# Patient Record
Sex: MALE | Race: BLACK OR AFRICAN AMERICAN | NOT HISPANIC OR LATINO | Employment: FULL TIME | URBAN - METROPOLITAN AREA
[De-identification: names, ages, dates, MRNs, and addresses within clinical notes are randomized per-mention and may not be internally consistent; named-entity substitution may affect disease eponyms.]

---

## 2024-02-23 ENCOUNTER — HOSPITAL ENCOUNTER (EMERGENCY)
Facility: CLINIC | Age: 49
Discharge: HOME | End: 2024-02-23
Attending: INTERNAL MEDICINE | Admitting: INTERNAL MEDICINE
Payer: OTHER MISCELLANEOUS

## 2024-02-23 ENCOUNTER — APPOINTMENT (OUTPATIENT)
Dept: GENERAL RADIOLOGY | Facility: CLINIC | Age: 49
End: 2024-02-23
Attending: INTERNAL MEDICINE
Payer: OTHER MISCELLANEOUS

## 2024-02-23 VITALS
TEMPERATURE: 97.9 F | BODY MASS INDEX: 25.76 KG/M2 | HEART RATE: 72 BPM | DIASTOLIC BLOOD PRESSURE: 101 MMHG | HEIGHT: 68 IN | OXYGEN SATURATION: 100 % | WEIGHT: 170 LBS | RESPIRATION RATE: 18 BRPM | SYSTOLIC BLOOD PRESSURE: 166 MMHG

## 2024-02-23 DIAGNOSIS — S69.92XA FINGER INJURY, LEFT, INITIAL ENCOUNTER: ICD-10-CM

## 2024-02-23 PROCEDURE — 99284 EMERGENCY DEPT VISIT MOD MDM: CPT | Performed by: INTERNAL MEDICINE

## 2024-02-23 PROCEDURE — 90715 TDAP VACCINE 7 YRS/> IM: CPT | Performed by: INTERNAL MEDICINE

## 2024-02-23 PROCEDURE — 99284 EMERGENCY DEPT VISIT MOD MDM: CPT | Mod: 25 | Performed by: INTERNAL MEDICINE

## 2024-02-23 PROCEDURE — 73140 X-RAY EXAM OF FINGER(S): CPT | Mod: LT

## 2024-02-23 PROCEDURE — 250N000011 HC RX IP 250 OP 636: Performed by: INTERNAL MEDICINE

## 2024-02-23 PROCEDURE — 250N000009 HC RX 250: Performed by: INTERNAL MEDICINE

## 2024-02-23 PROCEDURE — 26750 TREAT FINGER FRACTURE EACH: CPT | Mod: F1 | Performed by: STUDENT IN AN ORGANIZED HEALTH CARE EDUCATION/TRAINING PROGRAM

## 2024-02-23 PROCEDURE — 90471 IMMUNIZATION ADMIN: CPT | Performed by: INTERNAL MEDICINE

## 2024-02-23 PROCEDURE — 11012 DEB SKIN BONE AT FX SITE: CPT | Performed by: STUDENT IN AN ORGANIZED HEALTH CARE EDUCATION/TRAINING PROGRAM

## 2024-02-23 RX ORDER — CEFAZOLIN SODIUM 2 G/100ML
2 INJECTION, SOLUTION INTRAVENOUS ONCE
Status: COMPLETED | OUTPATIENT
Start: 2024-02-23 | End: 2024-02-23

## 2024-02-23 RX ORDER — CEPHALEXIN 500 MG/1
500 CAPSULE ORAL 2 TIMES DAILY
Qty: 14 CAPSULE | Refills: 0 | Status: SHIPPED | OUTPATIENT
Start: 2024-02-23

## 2024-02-23 RX ORDER — IBUPROFEN 600 MG/1
600 TABLET, FILM COATED ORAL EVERY 6 HOURS PRN
Qty: 30 TABLET | Refills: 0 | Status: SHIPPED | OUTPATIENT
Start: 2024-02-23

## 2024-02-23 RX ORDER — LIDOCAINE HYDROCHLORIDE 10 MG/ML
10 INJECTION, SOLUTION INFILTRATION; PERINEURAL ONCE
Status: COMPLETED | OUTPATIENT
Start: 2024-02-23 | End: 2024-02-23

## 2024-02-23 RX ORDER — OXYCODONE HYDROCHLORIDE 5 MG/1
5 TABLET ORAL EVERY 6 HOURS PRN
Qty: 10 TABLET | Refills: 0 | Status: SHIPPED | OUTPATIENT
Start: 2024-02-23

## 2024-02-23 RX ADMIN — CEFAZOLIN SODIUM 2 G: 2 INJECTION, SOLUTION INTRAVENOUS at 19:46

## 2024-02-23 RX ADMIN — CLOSTRIDIUM TETANI TOXOID ANTIGEN (FORMALDEHYDE INACTIVATED), CORYNEBACTERIUM DIPHTHERIAE TOXOID ANTIGEN (FORMALDEHYDE INACTIVATED), BORDETELLA PERTUSSIS TOXOID ANTIGEN (GLUTARALDEHYDE INACTIVATED), BORDETELLA PERTUSSIS FILAMENTOUS HEMAGGLUTININ ANTIGEN (FORMALDEHYDE INACTIVATED), BORDETELLA PERTUSSIS PERTACTIN ANTIGEN, AND BORDETELLA PERTUSSIS FIMBRIAE 2/3 ANTIGEN 0.5 ML: 5; 2; 2.5; 5; 3; 5 INJECTION, SUSPENSION INTRAMUSCULAR at 17:38

## 2024-02-23 RX ADMIN — LIDOCAINE HYDROCHLORIDE 10 ML: 10 INJECTION, SOLUTION INFILTRATION; PERINEURAL at 19:30

## 2024-02-23 ASSESSMENT — COLUMBIA-SUICIDE SEVERITY RATING SCALE - C-SSRS
6. HAVE YOU EVER DONE ANYTHING, STARTED TO DO ANYTHING, OR PREPARED TO DO ANYTHING TO END YOUR LIFE?: NO
2. HAVE YOU ACTUALLY HAD ANY THOUGHTS OF KILLING YOURSELF IN THE PAST MONTH?: NO
1. IN THE PAST MONTH, HAVE YOU WISHED YOU WERE DEAD OR WISHED YOU COULD GO TO SLEEP AND NOT WAKE UP?: NO

## 2024-02-23 ASSESSMENT — ACTIVITIES OF DAILY LIVING (ADL)
ADLS_ACUITY_SCORE: 35
ADLS_ACUITY_SCORE: 35
ADLS_ACUITY_SCORE: 33
ADLS_ACUITY_SCORE: 35
ADLS_ACUITY_SCORE: 35

## 2024-02-23 NOTE — ED TRIAGE NOTES
Triage Assessment (Adult)       Row Name 02/23/24 3196          Triage Assessment    Airway WDL WDL        Respiratory WDL    Respiratory WDL WDL        Skin Circulation/Temperature WDL    Skin Circulation/Temperature WDL WDL        Cardiac WDL    Cardiac WDL WDL        Peripheral/Neurovascular WDL    Peripheral Neurovascular WDL WDL        Cognitive/Neuro/Behavioral WDL    Cognitive/Neuro/Behavioral WDL WDL

## 2024-02-23 NOTE — ED TRIAGE NOTES
Finger injury while at work today. Bleeding controlled at this time.     Triage Assessment (Adult)       Row Name 02/23/24 0037          Triage Assessment    Airway WDL WDL        Respiratory WDL    Respiratory WDL WDL        Skin Circulation/Temperature WDL    Skin Circulation/Temperature WDL WDL        Cardiac WDL    Cardiac WDL WDL        Peripheral/Neurovascular WDL    Peripheral Neurovascular WDL WDL        Cognitive/Neuro/Behavioral WDL    Cognitive/Neuro/Behavioral WDL WDL

## 2024-02-23 NOTE — ED PROVIDER NOTES
"    [facility]    2/23/24      ED PROVIDER NOTE      History     Chief Complaint   Patient presents with    Hand Pain     The history is provided by the patient and medical records.     Godoe Komlan Anani Degbehe is an otherwise healthy 49 year old male who presents for evaluation of left finger injury. States that he sustained this injury this afternoon about an hour prior to his presentation.  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  He has noticed immediate pain and deformity and his nail has almost fallen off.  He is not sure of his tetanus status.  No other complaints.    This part of the medical record was transcribed by Maya Simms Medical Scribe, from a dictation done by Sergei Ly MD.     Past Medical History  No past medical history on file.  No past surgical history on file.  cephALEXin (KEFLEX) 500 MG capsule  ibuprofen (ADVIL/MOTRIN) 600 MG tablet  oxyCODONE (ROXICODONE) 5 MG tablet      No Known Allergies  Family History  No family history on file.  Social History          A medically appropriate review of systems was performed with pertinent positives and negatives noted in the HPI, and all other systems negative.    Physical Exam   BP: (!) 166/101  Pulse: 72  Temp: 97.9  F (36.6  C)  Resp: 18  Height: 172.7 cm (5' 8\")  Weight: 77.1 kg (170 lb)  SpO2: 100 %  Physical Exam  Vitals and nursing note reviewed.   Constitutional:       General: He is not in acute distress.     Appearance: Normal appearance. He is not toxic-appearing or diaphoretic.   HENT:      Head: Atraumatic.   Eyes:      General: No scleral icterus.     Conjunctiva/sclera: Conjunctivae normal.      Pupils: Pupils are equal, round, and reactive to light.   Cardiovascular:      Rate and Rhythm: Normal rate.      Heart sounds: Normal heart sounds.   Pulmonary:      Effort: Pulmonary effort is normal. No respiratory distress.      Breath " sounds: Normal breath sounds.   Abdominal:      General: Bowel sounds are normal.      Palpations: Abdomen is soft.      Tenderness: There is no abdominal tenderness.   Musculoskeletal:      Right hand: Normal.      Left hand: Swelling, deformity, laceration, tenderness and bony tenderness present. Decreased range of motion. Decreased strength. Decreased sensation.        Arms:       Cervical back: Neck supple.   Skin:     General: Skin is warm.      Findings: No rash.   Neurological:      Mental Status: He is alert.         ED Course, Procedures, & Data      Procedures                   Results for orders placed or performed during the hospital encounter of 02/23/24   Fingers XR, 2-3 views, left     Status: None    Narrative    EXAM: XR FINGER LEFT G/E 2 VIEWS  LOCATION: [facility]  DATE: 2/23/2024    INDICATION: Pain.  COMPARISON: None.      Impression    IMPRESSION: Soft tissue defect at the distal tip of the second digit distal phalanx with extensive the comminuted and displaced fracture (8 mm volar displacement of the distal fragments compared to proximal fracture fragments). Associated open component   through the nailbed dorsally.        NOTE: ABNORMAL REPORT    THE DICTATION ABOVE DESCRIBES AN ABNORMALITY FOR WHICH FOLLOW-UP IS NEEDED.       Medications   Tdap (tetanus-diphtheria-acell pertussis) (ADACEL) injection 0.5 mL (0.5 mLs Intramuscular $Given 2/23/24 1738)   lidocaine 1 % injection 10 mL (10 mLs Other $Given by Other 2/23/24 1930)   ceFAZolin (ANCEF) 2 g in 100 mL D5W intermittent infusion (0 g Intravenous Stopped 2/23/24 2016)     Labs Ordered and Resulted from Time of ED Arrival to Time of ED Departure - No data to display  Fingers XR, 2-3 views, left   Final Result   IMPRESSION: Soft tissue defect at the distal tip of the second digit distal phalanx with extensive the comminuted and displaced fracture (8 mm volar displacement of the distal fragments  compared to proximal fracture fragments). Associated open component    through the nailbed dorsally.            NOTE: ABNORMAL REPORT      THE DICTATION ABOVE DESCRIBES AN ABNORMALITY FOR WHICH FOLLOW-UP IS NEEDED.                Critical care was not performed.     Medical Decision Making  The patient's presentation was of high complexity (an acute health issue posing potential threat to life or bodily function).    The patient's evaluation involved:  ordering and/or review of 3+ test(s) in this encounter (see separate area of note for details)    The patient's management necessitated moderate risk (prescription drug management including medications given in the ED).    Assessment & Plan    Left index finger complex crush injury with nail avulsed and distal phalanx fractured off as in the photos, Ortho consult done-apparently pt decided to try to preserve per Ortho who felt may end up needing amputation, repaired by them, given ancief and Tdap here and discharge with keflex pain meds, close follow up with Ortho within one week.    I have reviewed the nursing notes. I have reviewed the findings, diagnosis, plan and need for follow up with the patient.    Discharge Medication List as of 2/23/2024  9:03 PM        START taking these medications    Details   cephALEXin (KEFLEX) 500 MG capsule Take 1 capsule (500 mg) by mouth 2 times daily, Disp-14 capsule, R-0, [facility]      ibuprofen (ADVIL/MOTRIN) 600 MG tablet Take 1 tablet (600 mg) by mouth every 6 hours as needed for moderate pain, Disp-30 tablet, R-0, [facility]      oxyCODONE (ROXICODONE) 5 MG tablet Take 1 tablet (5 mg) by mouth every 6 hours as needed for severe pain, Disp-10 tablet, R-0, [facility]             Final diagnoses:   Finger injury, left, initial encounter   I, Susu Palomo, am serving as a trained medical scribe to document services personally performed by Sergei Ly Md, based on the provider's statements to me.     I, Sergei Ly Md,  was physically present and have reviewed and verified the accuracy of this note documented by Susu Palomo.      Sergei Ly Md  [facility] EMERGENCY DEPARTMENT  2/23/2024     Sergei Ly MD  02/23/24 2030

## 2024-02-23 NOTE — ED TRIAGE NOTES
Triage Assessment (Adult)       Row Name 02/23/24 6320          Triage Assessment    Airway WDL WDL        Respiratory WDL    Respiratory WDL WDL        Skin Circulation/Temperature WDL    Skin Circulation/Temperature WDL WDL        Cardiac WDL    Cardiac WDL WDL        Peripheral/Neurovascular WDL    Peripheral Neurovascular WDL WDL        Cognitive/Neuro/Behavioral WDL    Cognitive/Neuro/Behavioral WDL WDL

## 2024-02-24 NOTE — DISCHARGE INSTRUCTIONS
Please make an appointment to follow up with Orthopedics Clinic for wound check (phone: [phone]) in 3-7 days even if entirely better.

## 2024-02-24 NOTE — CONSULTS
[facility]  ORTHOPAEDIC SURGERY CONSULT - HISTORY AND PHYSICAL    DATE OF CONSULT: 2/23/2024 7:09 PM    REQUESTING PROVIDER: Sergei Ly MD, MD - N Staff.    CC: Left index finger injury    DATE OF INJURY:February 23, 2024    HISTORY OF PRESENT ILLNESS:   Godoe Komlan Anani Degbehe is a 49 year old right-hand dominant male who presents to the emergency department for evaluation of a left index finger injury.  The patient states he was at work this afternoon when his left index finger got crushed under a part of a forklift at 3:50 PM.  He endorses significant left index finger pain and a wound over the tip of the finger.  In the emergency department x-rays demonstrated a distal phalanx fracture as well as significant nailbed injury.  Orthopedic surgery was then consulted.  Currently, the patient endorses numbness to the tip of his finger.  He denies any previous injuries or surgeries to the left index finger.    Occupation: .    PAST MEDICAL HISTORY:   No past medical history on file.  [Patient denies any personal history of bleeding disorders, clotting disorders, or adverse reactions to anesthesia].    PAST SURGICAL HISTORY:    No past surgical history on file.    MEDICATIONS:   Prior to Admission medications    Not on File       ALLERGIES:   Patient has no known allergies.    SOCIAL HISTORY:   Denies tobacco use   Social alcohol use   Denies recreational drug use.   Social History     Socioeconomic History    Marital status:      Spouse name: Not on file    Number of children: Not on file    Years of education: Not on file    Highest education level: Not on file   Occupational History    Not on file   Tobacco Use    Smoking status: Not on file    Smokeless tobacco: Not on file   Substance and Sexual Activity    Alcohol use: Not on file    Drug use: Not on file    Sexual activity: Not on file   Other Topics Concern    Not on file   Social History Narrative    Not on file  "    Social Determinants of Health     Financial Resource Strain: Not on file   Food Insecurity: Not on file   Transportation Needs: Not on file   Physical Activity: Not on file   Stress: Not on file   Social Connections: Not on file   Interpersonal Safety: Not on file   Housing Stability: Not on file       Hobbies: cooking     FAMILY HISTORY:  No family history on file.      REVIEW OF SYSTEMS:   Reviewed per HPI     PHYSICAL EXAM:   Vitals:    02/23/24 1653   BP: (!) 166/101   Pulse: 72   Resp: 18   Temp: 97.9  F (36.6  C)   TempSrc: Oral   SpO2: 100%     General: Awake, alert, appropriate, following commands, NAD.  Lungs: Breathing comfortably and nonlabored,  Heart/Cardiovascular: Extremities warm and well-perfused   left hand: Significant complex wound to the tip of the index finger.  Complex nailbed laceration with soft tissue loss to the distal aspect of the nailbed.  The nail is falling off and is holding on by a small portion of ulnar tissue.  This was removed.  Visible distal phalanx bone present.  The proximal pad of the digit has cap refill of approximately 2 seconds.  Otherwise the wound edges are sluggish and appear poorly perfused.         LABS:  No results found for: \"HGB\"  No results found for: \"WBC\"  No results found for: \"PLT\"  No results found for: \"INR\"  No results found for: \"CR\"  No results found for: \"GLC\"    IMAGING:  Distal phalanx fracture of the left index finger.    Procedure:  Irrigation and debridement left index finger, nailbed and wound repair.  A digital block was performed with a total of 8 cc of 1% lidocaine of the left index finger.  Following appropriate anesthesia the wound and hand was cleansed with Betadine and the area was sterilely prepped and draped with towels.  A total of 1 L of sterile saline was then used to irrigate the wound.  The nail was then removed with a Merlin and a knife.  The wound appeared to be clean.  There is visible bone present.  There is a significant " complex laceration to the tip of the finger that was nearly circumferential as well as a significant complex nailbed injury with tissue loss of the distal aspect of the nailbed.  A small portion of bone was debrided using a rongeur.  The fingertip and nailbed were then repaired with 3 -0 simple Chromic Gut sutures.  I was able to cover the distal aspect of his bone with tissue.  A piece of foil was placed in the nail fold and sutured in place.  At the end of the procedure, the patient's pad of his finger had sluggish cap refill of approximately 2 to 3 seconds.  The area was then dressed with bacitracin, Adaptic, gauze, Kerlix, and splinted with an AlumaFoam splint and Coban.  The patient tolerated the procedure well without any immediate complications.    IMPRESSION:   Godoe Komlan Anani Degbehe is a 49 year old right-hand-dominant male who sustained a left index finger open distal phalanx fracture as well as complex distal finger laceration and nailbed injury.    The patient received 1 dose of Ancef and his tetanus was updated in the ED.  I discussed my recommendation for irrigation and debridement in the emergency department to minimize his risk of infection.  I discussed with him that unfortunately he has a significant injury to the distal tip of his finger with a complex laceration with tissue loss of the distal tip of the finger and nailbed.  He expressed that he would like to keep as much length of the fingertip as possible as much tissue as possible.  Through shared decision making we elected to move forward with irrigation and debridement of the index finger as well as nailbed and wound repair as noted above.  Following this he did have sluggish cap refill to the pad of the finger.  I discussed with him that he may go on to needing an additional procedure with a possible revision amputation of the fingertip.   We discussed my recommendation for urgent follow-up in hand clinic early next week for a wound  check and discussion of next steps in care.  He will monitor for symptoms of infection.  He expressed understanding and agreement with this plan.    RECOMMENDATIONS:   Nonweightbearing left hand.  Pain control per the emergency department.  Keep dressings clean dry and intact until follow-up next week.  Keflex per the ED.  Follow-up: Next week on either Monday or Tuesday for a wound check and discussion of next steps in care with a hand surgeon.  Schedulers have been message for follow-up.    Assessment and Plan discussed with Dr. Saab, Orthopaedic Surgery staff.     Janae Burciaga MD   Orthopedic Surgery, PGY-4

## 2024-02-26 ENCOUNTER — PRE VISIT (OUTPATIENT)
Dept: ORTHOPEDICS | Facility: CLINIC | Age: 49
End: 2024-02-26

## 2024-02-26 ENCOUNTER — TELEPHONE (OUTPATIENT)
Dept: ORTHOPEDICS | Facility: CLINIC | Age: 49
End: 2024-02-26

## 2024-02-26 ENCOUNTER — NURSE TRIAGE (OUTPATIENT)
Dept: NURSING | Facility: CLINIC | Age: 49
End: 2024-02-26

## 2024-02-26 ENCOUNTER — OFFICE VISIT (OUTPATIENT)
Dept: ORTHOPEDICS | Facility: CLINIC | Age: 49
End: 2024-02-26
Payer: OTHER MISCELLANEOUS

## 2024-02-26 ENCOUNTER — ANCILLARY PROCEDURE (OUTPATIENT)
Dept: GENERAL RADIOLOGY | Facility: CLINIC | Age: 49
End: 2024-02-26
Attending: STUDENT IN AN ORGANIZED HEALTH CARE EDUCATION/TRAINING PROGRAM
Payer: OTHER MISCELLANEOUS

## 2024-02-26 DIAGNOSIS — S62.631B OPEN DISPLACED FRACTURE OF DISTAL PHALANX OF LEFT INDEX FINGER, INITIAL ENCOUNTER: ICD-10-CM

## 2024-02-26 DIAGNOSIS — S61.311A LACERATION OF LEFT INDEX FINGER WITHOUT FOREIGN BODY WITH DAMAGE TO NAIL, INITIAL ENCOUNTER: Primary | ICD-10-CM

## 2024-02-26 DIAGNOSIS — M79.645 FINGER PAIN, LEFT: ICD-10-CM

## 2024-02-26 DIAGNOSIS — M79.645 FINGER PAIN, LEFT: Primary | ICD-10-CM

## 2024-02-26 PROCEDURE — 99204 OFFICE O/P NEW MOD 45 MIN: CPT | Performed by: PHYSICIAN ASSISTANT

## 2024-02-26 PROCEDURE — 73140 X-RAY EXAM OF FINGER(S): CPT | Mod: LT | Performed by: RADIOLOGY

## 2024-02-26 NOTE — LETTER
February 26, 2024      Godoe Komlan Anani Degbehe  [address]   [place] [address]        To Whom It May Concern:    Godoe Komlan Anani Degbehe was seen in our clinic. He may not return to work until 3/6/24. Once he returns to work, he may only work light duty. He may not lift more than one pound or d any fine manipulations on or about his left index finger and hand. He will be re-evaluated in clinic in two weeks.      Sincerely,      Alma Bruno PA-C

## 2024-02-26 NOTE — LETTER
2/26/2024         RE: Godoe Komlan Anani Degbehe  [address]  [place] [address]        Dear Colleague,    Thank you for referring your patient, Godoe Komlan Anani Degbehe, to the [facility] [place]. Please see a copy of my visit note below.    Hand Surgery History & Physical    REFERRING PHYSICIAN: No ref. provider found   PRIMARY CARE PHYSICIAN: [facility]     Chief Complaint:   Consult (Left index finger complex crush injury with nail avulsed and distal phalanx fractured   DOI: 2/23/24)    History of Present Illness:     Godoe Komlan Anani Degbehe is a 49 year old right-hand dominant male who presents for evaluation of left index finger crush injury sustained on 2/23/2024 after getting his finger caught in a metal forklift.  He was seen at [place] ED following the injury.  He was found to have a significant complex finger laceration with nail avulsion, and associated open distal phalanx fracture.  He underwent wound irrigation, and attempt at laceration repair by orthopedics.  Tetanus was updated, he was given a dose of IV Ancef, discharged on a course of oral Keflex.    Patient has been taking oxycodone and Tylenol for pain.  Is taking antibiotics as directed.  Pain well-controlled. Denies numbness or tingling.     Past Medical History:   No past medical history on file.    Past Surgical History:   No past surgical history on file.    Social History:     Social History     Tobacco Use     Smoking status: Not on file     Smokeless tobacco: Not on file   Substance Use Topics     Alcohol use: Not on file       Family History:   No family history on file.    Allergies:   No Known Allergies    Medications:     Current Outpatient Medications   Medication     cephALEXin (KEFLEX) 500 MG capsule     ibuprofen (ADVIL/MOTRIN) 600 MG tablet     oxyCODONE (ROXICODONE) 5 MG tablet     No current facility-administered medications for this visit.         Review of Systems:     A 10 point ROS was performed and reviewed. Specific responses to these questions are noted at the end of the document.    Physical Exam:   Physical Exam Adult:  Musculoskeletal: A focused physical examination of the left index finger reveals:   Inspection -well-approximated complex laceration to the fingertip with sutures in place.  Mild deformity to the tip of the finger.  Foil  sutured in place and nailbed.  No visible wound.    Palpation - Deferred.   Neurovascular-incision intact to light touch throughout the tip of the finger.  Volar flap of tissue with brisk capillary refill.  Range of Motion: Able to flex and extend all fingers and thumbs.  No obvious extensor lag.    Imaging:   3 view X-ray of the left index finger was independently interpreted by me. The results were discussed with the patient.  Findings include: Comminuted open distal phalanx fracture.    Assessment and Plan:   Assessment:  49 year old male with complex crush injury to the left index finger as a work injury.  Associated open distal phalanx fracture, nailbed laceration.     Plan:   - I had a extensive discussion with the patient regarding treatment options.  Right now the tissue flap appears well-perfused with sensation.  First option is to see how the finger heals.  Can do daily dressing changes, monitor for any infection.  Will see how the nail grows in.  Discussed the risk of skin flap necrosis, nail may not grow back, neuroma.  If painful down the road could consider surgical intervention.  Second option would be to perform a revision partial Robin amputation.  Briefly discussed the procedure and postoperative plan.  Patient would like to try and see how the finger heals.  -  Recommend gently washing the wound and patting dry daily and daily dressing changes with xeroform , kerlix, and coban. Discussed signs and symptoms of infection such as erythema, drainage, increasing pain/swelling, fever or chills that  would prompt a return to the clinic sooner.  We did discuss that if partial skin flap necrosis occurs, he should keep it clean and dry.  - The patient should finish their oral course of antibiotics.   - Discussed that the nail may or may not grow back. They may have a nail deformity such as a crack, ridge or divot. The nail takes approximately 3-4 months to regrow and it takes 3-4 nail cycles (9-12) months to determine the final appearance of the nail. If at that time there are issues with the nail growth or appearance we can address treatment options at that time. We also discussed that nail injuries can be quite sensitive to temperature changes.   -Furl to hand therapy for fabrication of fingertip protector.  -Note provided.  - Follow up in 2 weeks for wound check. Knows to contact us in the interim with any questions or concerns.     Alma Bruno PA-C   Orthopedic Surgery        Again, thank you for allowing me to participate in the care of your patient.        Sincerely,        Lars Payton MD

## 2024-02-26 NOTE — TELEPHONE ENCOUNTER
Nurse Triage SBAR    Is this a 2nd Level Triage? NO    Situation:   Spoke with wife, Juliana, who is calling about 49 yr old Jroe who was seen 3 days ago in the ED for a L index finger fracture.    There is no consent to communicate indicated in the chart at this time.    Wife states she is looking for information about the [facility] and if they are able to expunge and re-wrap the wound on the fractured index finger.        Background:   Seen at [facility] ED 2/23/24 and advised to make follow up appointment at an orthopedic clinic for wound check    Assessment:   Information only; no triage.    Protocol Recommended Disposition:   Home care for information only    Recommendation:   No answer at [facility] [place] [facility].  Advised caller that the clinic is walk in and opens at 8:00 am.  Advised patient could also contact his PCP office for advice on follow up.  Caller voiced understanding.     Dianne Lyon RN  [facility]      Dianne Lyon RN 7:23 AM 2/26/2024  Reason for Disposition   Health information question, no triage required and triager able to answer question    Additional Information   Negative: New-onset or worsening symptoms, see that protocol (e.g., diarrhea, runny nose, sore throat)   Negative: Medicine question not related to refill or renewal   Negative: Requesting a renewal or refill of a medicine patient is currently taking   Negative: Questions or concerns about high blood pressure   Negative: Nursing judgment   Negative: Nursing judgment   Negative: Nursing judgment   Negative: Requesting lab results and adult stable (no new symptoms, not worsening)   Negative: Requesting referral to a specialist   Negative: Questions about durable medical equipment ordered and triager unable to answer   Negative: Requesting regular office appointment and adult stable (no new symptoms, not worsening)    Protocols used: Information Only Call - No Triage-A-OH

## 2024-02-26 NOTE — PROGRESS NOTES
Hand Surgery History & Physical    REFERRING PHYSICIAN: No ref. provider found   PRIMARY CARE PHYSICIAN: [facility]     Chief Complaint:   Consult (Left index finger complex crush injury with nail avulsed and distal phalanx fractured   DOI: 2/23/24)    History of Present Illness:     Godoe Komlan Anani Degbehe is a 49 year old right-hand dominant male who presents for evaluation of left index finger crush injury sustained on 2/23/2024 after getting his finger caught in a metal forklift.  He was seen at [place] ED following the injury.  He was found to have a significant complex finger laceration with nail avulsion, and associated open distal phalanx fracture.  He underwent wound irrigation, and attempt at laceration repair by orthopedics.  Tetanus was updated, he was given a dose of IV Ancef, discharged on a course of oral Keflex.    Patient has been taking oxycodone and Tylenol for pain.  Is taking antibiotics as directed.  Pain well-controlled. Denies numbness or tingling.     Past Medical History:   No past medical history on file.    Past Surgical History:   No past surgical history on file.    Social History:     Social History     Tobacco Use    Smoking status: Not on file    Smokeless tobacco: Not on file   Substance Use Topics    Alcohol use: Not on file       Family History:   No family history on file.    Allergies:   No Known Allergies    Medications:     Current Outpatient Medications   Medication    cephALEXin (KEFLEX) 500 MG capsule    ibuprofen (ADVIL/MOTRIN) 600 MG tablet    oxyCODONE (ROXICODONE) 5 MG tablet     No current facility-administered medications for this visit.        Review of Systems:     A 10 point ROS was performed and reviewed. Specific responses to these questions are noted at the end of the document.    Physical Exam:   Physical Exam Adult:  Musculoskeletal: A focused physical examination of the left index finger reveals:   Inspection -well-approximated  complex laceration to the fingertip with sutures in place.  Mild deformity to the tip of the finger.  Foil  sutured in place and nailbed.  No visible wound.    Palpation - Deferred.   Neurovascular-incision intact to light touch throughout the tip of the finger.  Volar flap of tissue with brisk capillary refill.  Range of Motion: Able to flex and extend all fingers and thumbs.  No obvious extensor lag.    Imaging:   3 view X-ray of the left index finger was independently interpreted by me. The results were discussed with the patient.  Findings include: Comminuted open distal phalanx fracture.    Assessment and Plan:   Assessment:  49 year old male with complex crush injury to the left index finger as a work injury.  Associated open distal phalanx fracture, nailbed laceration.     Plan:   - I had a extensive discussion with the patient regarding treatment options.  Right now the tissue flap appears well-perfused with sensation.  First option is to see how the finger heals.  Can do daily dressing changes, monitor for any infection.  Will see how the nail grows in.  Discussed the risk of skin flap necrosis, nail may not grow back, neuroma.  If painful down the road could consider surgical intervention.  Second option would be to perform a revision partial Robin amputation.  Briefly discussed the procedure and postoperative plan.  Patient would like to try and see how the finger heals.  -  Recommend gently washing the wound and patting dry daily and daily dressing changes with xeroform , kerlix, and coban. Discussed signs and symptoms of infection such as erythema, drainage, increasing pain/swelling, fever or chills that would prompt a return to the clinic sooner.  We did discuss that if partial skin flap necrosis occurs, he should keep it clean and dry.  - The patient should finish their oral course of antibiotics.   - Discussed that the nail may or may not grow back. They may have a nail deformity such as a crack,  ridge or divot. The nail takes approximately 3-4 months to regrow and it takes 3-4 nail cycles (9-12) months to determine the final appearance of the nail. If at that time there are issues with the nail growth or appearance we can address treatment options at that time. We also discussed that nail injuries can be quite sensitive to temperature changes.   -Furl to hand therapy for fabrication of fingertip protector.  -Note provided.  - Follow up in 2 weeks for wound check. Knows to contact us in the interim with any questions or concerns.     Alma Bruno PA-C   Orthopedic Surgery

## 2024-02-26 NOTE — NURSING NOTE
Reason For Visit:   Chief Complaint   Patient presents with    Consult     Left index finger complex crush injury with nail avulsed and distal phalanx fractured   DOI: 2/23/24       Primary MD: Destiney Dooley  Ref. MD: ED    Age: 49 year old    ?  No      There were no vitals taken for this visit.      Pain Assessment  Patient Currently in Pain: Yes  0-10 Pain Scale: 9  Primary Pain Location: Finger (Comment which one) (left index)  Pain Descriptors: Intermittent, Sharp  Alleviating Factors: Pain medication    Hand Dominance Evaluation  Hand Dominance: Right          QuickDASH Assessment      2/26/2024     1:54 PM   QuickDASH Main   1. Open a tight or new jar Unable   2. Do heavy household chores (e.g., wash walls, floors) Unable   3. Carry a shopping bag or briefcase Unable   4. Wash your back Unable   5. Use a knife to cut food Unable   6. Recreational activities in which you take some force or impact through your arm, shoulder or hand (e.g., golf, hammering, tennis, etc.) Unable   7. During the past week, to what extent has your arm, shoulder or hand problem interfered with your normal social activities with family, friends, neighbours or groups Extremely   8. During the past week, were you limited in your work or other regular daily activities as a result of your arm, shoulder or hand problem Very limited   9. Arm, shoulder or hand pain Extreme   10.Tingling (pins and needles) in your arm,shoulder or hand None   11. During the past week, how much difficulty have you had sleeping because of the pain in your arm, shoulder or hand Severe difficulty   Quickdash Ability Score 86.36          Current Outpatient Medications   Medication Sig Dispense Refill    cephALEXin (KEFLEX) 500 MG capsule Take 1 capsule (500 mg) by mouth 2 times daily 14 capsule 0    ibuprofen (ADVIL/MOTRIN) 600 MG tablet Take 1 tablet (600 mg) by mouth every 6 hours as needed for moderate pain 30 tablet 0     oxyCODONE (ROXICODONE) 5 MG tablet Take 1 tablet (5 mg) by mouth every 6 hours as needed for severe pain 10 tablet 0       No Known Allergies    JOCELYN VELAZCO, ATC

## 2024-02-26 NOTE — TELEPHONE ENCOUNTER
DIAGNOSIS: Left Index Finger Injury   APPOINTMENT DATE: 02/26/2024   NOTES STATUS DETAILS   DISCHARGE REPORT from the ER Internal 02/23/2024 - [facility]   XRAYS (IMAGES & REPORTS) Internal 02/23/2024 - Left Finger

## 2024-02-26 NOTE — TELEPHONE ENCOUNTER
[facility] and [facility] wife. They informed that the finger is bleeding a little bit and was wondering if they can be seen today. Spoke with Destiney. Ok to see them today. Apt scheduled with Dr Payton later today.      -[facility]      ----- Message -----  From: Elizabeth Mcgarry ATC  Sent: 2/26/2024   8:45 AM CST  To: Destiney Keys [facility]; [facility]; #  Subject: FW: urgent clinic follow up                      Where can we add this pt?      -[facility]    ----- Message -----  From: Janae Burciaga MD  Sent: 2/23/2024   9:30 PM CST  [facility]  Subject: urgent clinic follow up                          Hello,     Please schedule the patient urgent clinic follow up with one of the hand surgeons on 2/26 or 2/27 for a wound check of his left index finger and discussion on surgical options/possible revision finger tip amputation. He sustained an open finger tip fracture and complex nail bed injury on 2/23.     Thank you,   Janae

## 2024-02-28 ENCOUNTER — THERAPY VISIT (OUTPATIENT)
Dept: OCCUPATIONAL THERAPY | Facility: CLINIC | Age: 49
End: 2024-02-28
Payer: OTHER MISCELLANEOUS

## 2024-02-28 DIAGNOSIS — S61.311A LACERATION OF LEFT INDEX FINGER WITHOUT FOREIGN BODY WITH DAMAGE TO NAIL, INITIAL ENCOUNTER: ICD-10-CM

## 2024-02-28 DIAGNOSIS — M79.645 PAIN OF FINGER OF LEFT HAND: Primary | ICD-10-CM

## 2024-02-28 PROCEDURE — 97535 SELF CARE MNGMENT TRAINING: CPT | Mod: GO | Performed by: OCCUPATIONAL THERAPIST

## 2024-02-28 PROCEDURE — 97760 ORTHOTIC MGMT&TRAING 1ST ENC: CPT | Mod: GO | Performed by: OCCUPATIONAL THERAPIST

## 2024-02-28 NOTE — PROGRESS NOTES
OCCUPATIONAL THERAPY EVALUATION  Type of Visit: Evaluation    See electronic medical record for Abuse Screening details from ED visit 2/23/24.    Subjective      Presenting condition or subjective complaint: Index finger was crushed  Date of onset: 02/23/24    Relevant medical history: High blood pressure   Dates & types of surgery: none per pt report    Prior diagnostic imaging/testing results: X-ray     Prior therapy history for the same diagnosis, illness or injury: No      Prior Level of Function  ADL: Independent    Living Environment  Social support: With a significant other or spouse     Employment: No      Patient goals for therapy: able to use my left hand     Objective   ADDITIONAL HISTORY:  Right hand dominant  Patient reports symptoms of pain, stiffness/loss of motion, weakness/loss of strength, edema, and numbness of left index finger 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  Transportation: drives  Currently not working due to present treatment problem    Functional Outcome Measure:   NA, pt did not complete    PAIN:  Pain Level at Rest: 9/10  Pain Level with Use: 10/10  Pain Location: index finger  Pain Quality: Sharp and Throbbing  Pain Frequency: constant  Pain is Worst: constant  Pain is Exacerbated By: bumping  Pain is Relieved By: pain medication   Pain Progression: Unchanged    EDEMA: Moderate     SCAR/WOUND: Wound/Scar Signs: open wound tip of index finger/nail bed area with minimal granulation, moderate bloody drainage on bandage     SENSATION: Decreased at tip of finger per pt report     ROM: Contraindicated of index finger DIP; MCP and PIP AROM near normal limits on observation     STRENGTH: Contraindicated     Assessment & Plan   CLINICAL IMPRESSIONS  Medical Diagnosis: Laceration of left index finger without foreign body with damage to nail    Treatment Diagnosis: L index finger pain, swelling and stiffness s/p crush injury with P3 Fx    Impression/Assessment: Pt  is a 49 year old male presenting to Occupational Therapy due to left index finger crush injury.  Patient's limitations or Problem List includes: Pain, Decreased ROM/motion, Increased edema, Weakness, and healing wound  of the left index finger which interferes with the patient's ability to perform Self Care Tasks (dressing, eating, bathing), Work Tasks, Sleep Patterns, Recreational Activities, Household Chores, and Driving  as compared to previous level of function.    Clinical Decision Making (Complexity):  Assessment of Occupational Performance: 5 or more Performance Deficits  Occupational Performance Limitations: bathing/showering, dressing, hygiene and grooming, driving and community mobility, home establishment and management, meal preparation and cleanup, sleep, work, and leisure activities  Clinical Decision Making (Complexity): Low complexity    PLAN OF CARE  Treatment Interventions today:  Orthotic Fabrication:  Static Finger based  Self Care:  Self Care Tasks and Wound Care    Future Treatment Interventions:  Modalities:  US and Paraffin  Therapeutic Exercise:  AROM, PROM, Tendon Gliding, Blocking, and Isotonics  Neuromuscular re-education:  Desensitization  Manual Techniques:  Joint mobilization and Scar mobilization  Long Term Goals   OT Goal 1  Goal Identifier: self care  Goal Description: Pt will be able to cecilia/doff protective orthosis without difficulty after 1 sessions  Rationale: In order to maximize safety and independence with performance of self-care activities  Target Date: 02/28/24  Date Met: 02/28/24  OT Goal 2  Goal Identifier: gripping  Goal Description: Pt will increase ROM and decrease pain 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   Patient/Family/caregiver agrees with Plan of Care.     Evaluation Time:    OT Vickey, Low Complexity Minutes (33563):  (brief/no charge)   Present: Not applicable     Signing Clinician: Moni Hampton OT

## 2024-03-10 ENCOUNTER — HEALTH MAINTENANCE LETTER (OUTPATIENT)
Age: 49
End: 2024-03-10

## 2024-04-29 PROBLEM — S61.311A LACERATION OF LEFT INDEX FINGER WITHOUT FOREIGN BODY WITH DAMAGE TO NAIL, INITIAL ENCOUNTER: Status: RESOLVED | Noted: 2024-02-28 | Resolved: 2024-04-29

## 2024-04-29 PROBLEM — M79.645 PAIN OF FINGER OF LEFT HAND: Status: RESOLVED | Noted: 2024-02-28 | Resolved: 2024-04-29

## 2025-03-16 ENCOUNTER — HEALTH MAINTENANCE LETTER (OUTPATIENT)
Age: 50
End: 2025-03-16